# Patient Record
Sex: FEMALE | Race: WHITE | HISPANIC OR LATINO | URBAN - METROPOLITAN AREA
[De-identification: names, ages, dates, MRNs, and addresses within clinical notes are randomized per-mention and may not be internally consistent; named-entity substitution may affect disease eponyms.]

---

## 2022-02-22 ENCOUNTER — APPOINTMENT (RX ONLY)
Dept: URBAN - METROPOLITAN AREA CLINIC 84 | Facility: CLINIC | Age: 52
Setting detail: DERMATOLOGY
End: 2022-02-22

## 2022-02-22 DIAGNOSIS — Z41.9 ENCOUNTER FOR PROCEDURE FOR PURPOSES OTHER THAN REMEDYING HEALTH STATE, UNSPECIFIED: ICD-10-CM

## 2022-02-22 PROCEDURE — ? HYDRAFACIAL

## 2022-02-22 PROCEDURE — ? MEDICAL CONSULTATION: CHEMICAL PEELS

## 2022-02-22 PROCEDURE — ? ADDITIONAL NOTES

## 2022-02-22 ASSESSMENT — LOCATION ZONE DERM: LOCATION ZONE: FACE

## 2022-02-22 ASSESSMENT — LOCATION SIMPLE DESCRIPTION DERM: LOCATION SIMPLE: INFERIOR FOREHEAD

## 2022-02-22 ASSESSMENT — LOCATION DETAILED DESCRIPTION DERM: LOCATION DETAILED: INFERIOR MID FOREHEAD

## 2022-02-22 NOTE — HPI: COSMETIC (FACIAL)
Additional History: Patient present today for facial with extractions. Discussed benefits of HydraFacial. Planning to add Dermaplanning and manual extractions.

## 2022-12-07 NOTE — PROCEDURE: HYDRAFACIAL
Vacuum Pressure Low Setting (Will Not Render If Set To 0): 0
Number Of Passes: 1
Procedure: Extend and Protect
Continue Regimen: ciclopirox 8 % topical solution
Solution Override
Detail Level: Simple
Tip: Hydropeel Tip, Teal
Procedure: Peel
Continue Regimen: tacrolimus 0.1 % topical ointment to skin folds\\nclobetasol 0.05 % topical foam to body areas and posterior scalp
Location: face
Detail Level: Zone
Solution: Activ-4
Solution Override: Trihex and Vitamin C
Vacuum Pressure Low Setting (Will Not Render If Set To 0): 16
Procedure: Extraction
Tip: Hydropeel Tip, Clear
Solution: GlySal 7.5%
Solution Override: SkinCeuticals Hydrating & B5 gel
Solution: Beta-HD
Solution Override: B5 serum
Price (Use Numbers Only, No Special Characters Or $): 199
Vacuum Pressure Low Setting (Will Not Render If Set To 0): 20
Indication: anti-aging
Comments: Dermaplanning, manual extractions and Biocellulose mask included today.
Tip: Hydropeel Tip, Blue
Consent: Written consent obtained, risks reviewed including but not limited to crusting, scabbing, blistering, scarring, darker or lighter pigmentary change, bruising, and/or incomplete response.
Tip Override
Post-Care Instructions: I reviewed with the patient in detail post-care instructions. Patient should stay away from the sun and wear sun protection until treated areas are fully healed. Skin finalized with AloeCort,Elta 40 tinted Sunscreen .